# Patient Record
Sex: FEMALE | Race: WHITE | NOT HISPANIC OR LATINO | Employment: FULL TIME | URBAN - METROPOLITAN AREA
[De-identification: names, ages, dates, MRNs, and addresses within clinical notes are randomized per-mention and may not be internally consistent; named-entity substitution may affect disease eponyms.]

---
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
## 2019-06-17 ENCOUNTER — OFFICE VISIT - HEALTHEAST (OUTPATIENT)
Dept: FAMILY MEDICINE | Facility: CLINIC | Age: 19
End: 2019-06-17

## 2019-06-17 DIAGNOSIS — Z71.84 COUNSELING ABOUT TRAVEL: ICD-10-CM

## 2019-06-17 DIAGNOSIS — Z11.3 SCREENING FOR STD (SEXUALLY TRANSMITTED DISEASE): ICD-10-CM

## 2019-06-17 DIAGNOSIS — F41.9 ANXIETY: ICD-10-CM

## 2019-06-17 ASSESSMENT — MIFFLIN-ST. JEOR: SCORE: 1365.4

## 2019-06-18 ENCOUNTER — COMMUNICATION - HEALTHEAST (OUTPATIENT)
Dept: FAMILY MEDICINE | Facility: CLINIC | Age: 19
End: 2019-06-18

## 2019-06-18 LAB
C TRACH DNA SPEC QL PROBE+SIG AMP: NEGATIVE
N GONORRHOEA DNA SPEC QL NAA+PROBE: NEGATIVE

## 2019-08-16 ENCOUNTER — OFFICE VISIT - HEALTHEAST (OUTPATIENT)
Dept: FAMILY MEDICINE | Facility: CLINIC | Age: 19
End: 2019-08-16

## 2019-08-16 DIAGNOSIS — Z30.46 NEXPLANON REMOVAL: ICD-10-CM

## 2019-08-16 ASSESSMENT — MIFFLIN-ST. JEOR: SCORE: 1366.42

## 2020-03-06 ENCOUNTER — COMMUNICATION - HEALTHEAST (OUTPATIENT)
Dept: FAMILY MEDICINE | Facility: CLINIC | Age: 20
End: 2020-03-06

## 2020-03-06 DIAGNOSIS — F41.9 ANXIETY: ICD-10-CM

## 2020-06-05 ENCOUNTER — COMMUNICATION - HEALTHEAST (OUTPATIENT)
Dept: FAMILY MEDICINE | Facility: CLINIC | Age: 20
End: 2020-06-05

## 2020-06-05 DIAGNOSIS — F41.9 ANXIETY: ICD-10-CM

## 2020-06-11 ENCOUNTER — COMMUNICATION - HEALTHEAST (OUTPATIENT)
Dept: FAMILY MEDICINE | Facility: CLINIC | Age: 20
End: 2020-06-11

## 2020-07-23 ENCOUNTER — OFFICE VISIT - HEALTHEAST (OUTPATIENT)
Dept: FAMILY MEDICINE | Facility: CLINIC | Age: 20
End: 2020-07-23

## 2020-07-23 DIAGNOSIS — F41.9 ANXIETY: ICD-10-CM

## 2020-07-23 ASSESSMENT — ANXIETY QUESTIONNAIRES
1. FEELING NERVOUS, ANXIOUS, OR ON EDGE: NOT AT ALL
6. BECOMING EASILY ANNOYED OR IRRITABLE: NOT AT ALL
GAD7 TOTAL SCORE: 0
5. BEING SO RESTLESS THAT IT IS HARD TO SIT STILL: NOT AT ALL
7. FEELING AFRAID AS IF SOMETHING AWFUL MIGHT HAPPEN: NOT AT ALL
IF YOU CHECKED OFF ANY PROBLEMS ON THIS QUESTIONNAIRE, HOW DIFFICULT HAVE THESE PROBLEMS MADE IT FOR YOU TO DO YOUR WORK, TAKE CARE OF THINGS AT HOME, OR GET ALONG WITH OTHER PEOPLE: NOT DIFFICULT AT ALL
2. NOT BEING ABLE TO STOP OR CONTROL WORRYING: NOT AT ALL
4. TROUBLE RELAXING: NOT AT ALL
3. WORRYING TOO MUCH ABOUT DIFFERENT THINGS: NOT AT ALL

## 2021-04-15 ENCOUNTER — COMMUNICATION - HEALTHEAST (OUTPATIENT)
Dept: FAMILY MEDICINE | Facility: CLINIC | Age: 21
End: 2021-04-15

## 2021-04-15 DIAGNOSIS — F41.9 ANXIETY: ICD-10-CM

## 2021-05-28 ASSESSMENT — ANXIETY QUESTIONNAIRES: GAD7 TOTAL SCORE: 0

## 2021-05-29 NOTE — PROGRESS NOTES
Assessment and Plan:     1. Anxiety  She continues sertraline 50 mg daily.  - sertraline (ZOLOFT) 50 MG tablet; Take 1 tablet (50 mg total) by mouth daily.  Dispense: 90 tablet; Refill: 2    2. Counseling about travel  Reviewed CDC guidelines.  Provided second hepatitis A vaccine and third Gardasil vaccine.  She does not need malaria prophylaxis as she is traveling to [facility] in [place] and mosquito avoidance is recommended in [place].  Provided oral typhoid vaccine.  Provided prescription for Cipro to take as needed for traveler's diarrhea.  She denies the need for rabies vaccine. She is up-to-date on Hepatitis B and MMR vaccines.  I wrote out a prescription for yellow fever vaccine.  She will take this to a travel clinic to receive this.  Discussed meningitis booster, but she declines.  She plans on following up for that prior to her trip.  She will schedule nurse only appointment.  - typhoid (VIVOTIF) SR capsule; Take 1 capsule by mouth every other day for 4 doses.  Dispense: 4 capsule; Refill: 0  - ciprofloxacin HCl (CIPRO) 500 MG tablet; Take 1 tablet (500 mg total) by mouth 2 (two) times a day for 5 days. As needed for traveler's diarrhea  Dispense: 10 tablet; Refill: 0    3. Screening for STD (sexually transmitted disease)  Discussed safe sex practices.  Will notify patient of results.  The patient is content with the plan.   - Chlamydia trachomatis & Neisseria gonorrhoeae, Amplified Detection    Subjective:     Britt is a 19 y.o. female presenting to the clinic for multiple concerns today.  Patient is taking sertraline 50 mg daily for anxiety.  She states this is well controlled.  She feels anxiety in social situations.  She denies any current stressors.  She does not have thoughts of suicide.  Patient is due for STD screening.  She is single and is not currently sexually active.  Her last menstrual period was on 6/9/2019.  She has the Nexplanon in place.  She denies history of STDs in the past.  Lastly,  patient presents for travel consult.  She is leaving for [place] in mid-September and will be traveling there for 10 days.  She then travels to [facility] where she will study abroad for 12 weeks.  She returns in December.  This is through [facility].  She will be traveling with a friend.  She denies recent cold symptoms including rhinorrhea, postnasal drainage, cough, headache, stomachache, nausea, vomiting, fever.    Review of Systems: A complete 14 point review of systems was obtained and is negative or as stated in the history of present illness.    Social History     Socioeconomic History     Marital status: Single     Spouse name: Not on file     Number of children: Not on file     Years of education: Not on file     Highest education level: Not on file   Occupational History     Not on file   Social Needs     Financial resource strain: Not on file     Food insecurity:     Worry: Not on file     Inability: Not on file     Transportation needs:     Medical: Not on file     Non-medical: Not on file   Tobacco Use     Smoking status: Never Smoker     Smokeless tobacco: Never Used   Substance and Sexual Activity     Alcohol use: No     Drug use: No     Sexual activity: Not on file     Comment: single   Lifestyle     Physical activity:     Days per week: Not on file     Minutes per session: Not on file     Stress: Not on file   Relationships     Social connections:     Talks on phone: Not on file     Gets together: Not on file     Attends Voodoo service: Not on file     Active member of club or organization: Not on file     Attends meetings of clubs or organizations: Not on file     Relationship status: Not on file     Intimate partner violence:     Fear of current or ex partner: Not on file     Emotionally abused: Not on file     Physically abused: Not on file     Forced sexual activity: Not on file   Other Topics Concern     Not on file   Social History Narrative     Not on file       Active Ambulatory Problems  "    Diagnosis Date Noted     Plantar Warts      Dyshidrotic Eczema      Resolved Ambulatory Problems     Diagnosis Date Noted     No Resolved Ambulatory Problems     Past Medical History:   Diagnosis Date     Anxiety        Family History   Problem Relation Age of Onset     No Medical Problems Mother      No Medical Problems Father      Diabetes Maternal Grandmother        Objective:     /58   Pulse 81   Ht 5' 8.25\" (1.734 m)   Wt 121 lb 14.4 oz (55.3 kg)   LMP 06/09/2019 Comment: nexplanon  SpO2 98%   BMI 18.40 kg/m      Patient is alert, in no obvious distress.   Skin: Warm, dry.  No lesions or rashes.  Skin turgor rapid return.   HEENT:  Head normocephalic, atraumatic.  Eyes normal.  Ears normal.  Nose patent, mucosa pink.  Oropharynx mucosa pink.  No lesions or tonsillar enlargement.   Neck: Supple, no lymphadenopathy.   Lungs:  Clear to auscultation. Respirations even and unlabored.  No wheezing or rales noted.   Heart:  Regular rate and rhythm.  No murmurs.                "

## 2021-05-31 VITALS — BODY MASS INDEX: 17.41 KG/M2 | HEIGHT: 68 IN | WEIGHT: 114.9 LBS

## 2021-05-31 NOTE — PROGRESS NOTES
Assessment  1. Nexplanon removal     2. Contraception  medroxyPROGESTERone injection 150 mg (DEPO-PROVERA)       Plan    1. Nexplanon removal: Procedure and risks discussed with patient who voiced understanding and agreed to proceed with removal of implant.  Implant was identified in medial aspect of left upper arm and pen used to paresh distal end of implant.  Skin cleansed with alcohol swab.  Local anesthetic administered with 1% lidocaine with epinephrine, approximately 2.5 mL. Skin was cleansed with 3 Betadine swabs.  Area draped in sterile fashion.  #11 scalpel was used to make an approximately 1 cm incision near the distal end of implant.  Subcutaneous tissue was dissected and distal end of implant was identified and grasped with a needle love.  Subcutaneous skin was further dissected and the implant was then removed in its entirety and placed in biohazard bag for disposal.  Patient tolerated procedure well.  Estimated blood loss minimal.  Skin cleansed with soap and water.  Steri-Strips placed over her incision site to approximate wound edges.  Bandage placed over incision and compression dressing applied.  Patient counseled to leave compression dressing on for 24 hours and then keep wound covered with bandage for 2-3 days.  Discussed she may experience bruising and soreness for the next couple of days.  May use ibuprofen and ice packs as needed for discomfort.  Monitor for signs of infection.  Follow-up if any concerns or problems.    2.  Contraception: Patient desires to resume Depo-Provera injections for contraception.  Depo-Provera injection administered today.    Subjective  Britt Causey is a 19 y.o. female who comes in today for removal of Nexplanon.  She had Nexplanon placed in February 2019 at the school clinic at [facility].  Since placement she has been bothered by irregular menstrual bleeding and cramping.  She desires to have the Nexplanon removed and she would like to restart  "Depo-Provera injections which is what she was using a prior to having the Nexplanon placed.  She has no other concerns or questions.  We reviewed and updated medications and allergies.  Review of systems otherwise negative.    Current Outpatient Medications   Medication Sig Dispense Refill     sertraline (ZOLOFT) 50 MG tablet Take 1 tablet (50 mg total) by mouth daily. 90 tablet 2     Current Facility-Administered Medications   Medication Dose Route Frequency Provider Last Rate Last Dose     [START ON 8/17/2019] medroxyPROGESTERone injection 150 mg (DEPO-PROVERA)  150 mg Intramuscular Q3 Months Elma Herbert MD             Objective   /64 (Patient Site: Left Arm, Patient Position: Sitting, Cuff Size: Adult Regular)   Pulse 82   Temp 99.1  F (37.3  C) (Oral)   Ht 5' 8\" (1.727 m)   Wt 123 lb (55.8 kg)   LMP 08/05/2019   SpO2 99%   Breastfeeding? No   BMI 18.70 kg/m        Gen: alert, no acute distress  Skin: Nexplanon palpated in medial aspect of left upper arm  "

## 2021-06-01 VITALS — WEIGHT: 118.2 LBS | BODY MASS INDEX: 17.91 KG/M2 | HEIGHT: 68 IN

## 2021-06-03 VITALS — HEIGHT: 68 IN | WEIGHT: 123 LBS | BODY MASS INDEX: 18.64 KG/M2

## 2021-06-03 VITALS — WEIGHT: 121.9 LBS | BODY MASS INDEX: 18.47 KG/M2 | HEIGHT: 68 IN

## 2021-06-06 NOTE — TELEPHONE ENCOUNTER
RN cannot approve Refill Request    RN can NOT refill this medication Due to age. Last office visit: 6/17/2019 Samara Perez CNP Last Physical: Visit date not found Last MTM visit: Visit date not found Last visit same specialty: 8/16/2019 Elma Herbert MD.  Next visit within 3 mo: Visit date not found  Next physical within 3 mo: Visit date not found      Shravan VON Vera, [facility] Triage/Med Refill 3/6/2020    Requested Prescriptions   Pending Prescriptions Disp Refills     sertraline (ZOLOFT) 50 MG tablet [Pharmacy Med Name: SERTRALINE HCL 50 MG TABLET] 90 tablet 2     Sig: TAKE 1 TABLET BY MOUTH EVERY DAY       [facility] Refill Protocol  Failed - 3/6/2020  1:29 AM        Failed - Age 21 and younger route to prescribing provider     Last office visit with prescriber/PCP: 6/17/2019 Samara Perez CNP OR same dept: 8/16/2019 Elma Herbert MD OR same specialty: 8/16/2019 Elma Herbert MD  Last physical: Visit date not found Last MTM visit: Visit date not found   Next visit within 3 mo: Visit date not found  Next physical within 3 mo: Visit date not found  Prescriber OR PCP: Samara Perez CNP  Last diagnosis associated with med order: 1. Anxiety  - sertraline (ZOLOFT) 50 MG tablet [Pharmacy Med Name: SERTRALINE HCL 50 MG TABLET]; TAKE 1 TABLET BY MOUTH EVERY DAY  Dispense: 90 tablet; Refill: 2    If protocol passes may refill for 12 months if within 3 months of last provider visit (or a total of 15 months).             Passed - PCP or prescribing provider visit in last year     Last office visit with prescriber/PCP: 6/17/2019 Samara Perez CNP OR same dept: 8/16/2019 Elma Herbert MD OR same specialty: 8/16/2019 Elma Herbert MD  Last physical: Visit date not found Last MTM visit: Visit date not found   Next visit within 3 mo: Visit date not found  Next physical within 3 mo: Visit date not found  Prescriber OR PCP: Samara Perez CNP  Last diagnosis associated with med order: 1. Anxiety  - sertraline  (ZOLOFT) 50 MG tablet [Pharmacy Med Name: SERTRALINE HCL 50 MG TABLET]; TAKE 1 TABLET BY MOUTH EVERY DAY  Dispense: 90 tablet; Refill: 2    If protocol passes may refill for 12 months if within 3 months of last provider visit (or a total of 15 months).

## 2021-06-08 NOTE — TELEPHONE ENCOUNTER
RN cannot approve Refill Request    RN can NOT refill this medication med is not covered by policy/route to provider. Last office visit: 6/17/2019 Samara Perez CNP Last Physical: Visit date not found Last MTM visit: Visit date not found Last visit same specialty: 8/16/2019 Elma Herbert MD.  Next visit within 3 mo: Visit date not found  Next physical within 3 mo: Visit date not found      Mayda Fabian, [facility] Triage/Med Refill 6/6/2020    Requested Prescriptions   Pending Prescriptions Disp Refills     sertraline (ZOLOFT) 50 MG tablet [Pharmacy Med Name: SERTRALINE HCL 50 MG TABLET] 90 tablet 0     Sig: TAKE 1 TABLET BY MOUTH EVERY DAY       [facility] Refill Protocol  Failed - 6/5/2020 11:31 AM        Failed - Age 21 and younger route to prescribing provider     Last office visit with prescriber/PCP: 6/17/2019 Samara Perez CNP OR same dept: 8/16/2019 Elma Herbert MD OR same specialty: 8/16/2019 Elma Herbert MD  Last physical: Visit date not found Last MTM visit: Visit date not found   Next visit within 3 mo: Visit date not found  Next physical within 3 mo: Visit date not found  Prescriber OR PCP: Samara Perez CNP  Last diagnosis associated with med order: 1. Anxiety  - sertraline (ZOLOFT) 50 MG tablet [Pharmacy Med Name: SERTRALINE HCL 50 MG TABLET]; TAKE 1 TABLET BY MOUTH EVERY DAY  Dispense: 90 tablet; Refill: 0    If protocol passes may refill for 12 months if within 3 months of last provider visit (or a total of 15 months).             Passed - PCP or prescribing provider visit in last year     Last office visit with prescriber/PCP: 6/17/2019 Samara Perez CNP OR same dept: 8/16/2019 Elma Herbert MD OR same specialty: 8/16/2019 Elma Herbert MD  Last physical: Visit date not found Last MTM visit: Visit date not found   Next visit within 3 mo: Visit date not found  Next physical within 3 mo: Visit date not found  Prescriber OR PCP: Samara Perez CNP  Last diagnosis associated with med  order: 1. Anxiety  - sertraline (ZOLOFT) 50 MG tablet [Pharmacy Med Name: SERTRALINE HCL 50 MG TABLET]; TAKE 1 TABLET BY MOUTH EVERY DAY  Dispense: 90 tablet; Refill: 0    If protocol passes may refill for 12 months if within 3 months of last provider visit (or a total of 15 months).

## 2021-06-08 NOTE — TELEPHONE ENCOUNTER
Left message to call back for: pt  Information to relay to patient:  2nd message to call and schedule virtual visit with Samara catalan: medication refill

## 2021-06-08 NOTE — TELEPHONE ENCOUNTER
Left message to call back for: pt  Information to relay to patient:  Left message to call and schedule virtual visit with Samara catalan: medication refill

## 2021-06-09 NOTE — PROGRESS NOTES
"Britt Causey is a 20 y.o. female who is being evaluated via a billable telephone visit.      The patient has been notified of following:     \"This telephone visit will be conducted via a call between you and your physician/provider. We have found that certain health care needs can be provided without the need for a physical exam.  This service lets us provide the care you need with a short phone conversation.  If a prescription is necessary we can send it directly to your pharmacy.  If lab work is needed we can place an order for that and you can then stop by our lab to have the test done at a later time.    Telephone visits are billed at different rates depending on your insurance coverage. During this emergency period, for some insurers they may be billed the same as an in-person visit.  Please reach out to your insurance provider with any questions.    If during the course of the call the physician/provider feels a telephone visit is not appropriate, you will not be charged for this service.\"    Patient has given verbal consent to a Telephone visit? Yes    What phone number would you like to be contacted at? [phone]        Additional provider notes:   Assessment and Plan:     1. Anxiety  sertraline (ZOLOFT) 50 MG tablet     This is controlled.  She continues sertraline 50 mg daily.  She is to follow-up in 6 months for medication management or sooner with any further concerns.  She is content with the plan.     Subjective:     Britt is a 20 y.o. female presenting for a telephone visit.  Patient was diagnosed with anxiety in high school.  She is currently taking sertraline 50 mg daily and is tolerating the medication well.  She denies any side effects.  She feels as though her mood is stable.  She is currently attending [employer] and majoring in marketing and [employer].  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  Patient feels as though the medication assists with relaxing her.  She does " not have ruminating thoughts.  She denies thoughts of suicide.  She denies any current added stress.    Review of Systems: A complete 14 point review of systems was obtained and is negative or as stated in the history of present illness.    Social History     Socioeconomic History     Marital status: Single     Spouse name: Not on file     Number of children: Not on file     Years of education: Not on file     Highest education level: Not on file   Occupational History     Not on file   Social Needs     Financial resource strain: Not on file     Food insecurity     Worry: Not on file     Inability: Not on file     Transportation needs     Medical: Not on file     Non-medical: Not on file   Tobacco Use     Smoking status: Never Smoker     Smokeless tobacco: Never Used   Substance and Sexual Activity     Alcohol use: No     Drug use: No     Sexual activity: Not on file     Comment: single   Lifestyle     Physical activity     Days per week: Not on file     Minutes per session: Not on file     Stress: Not on file   Relationships     Social connections     Talks on phone: Not on file     Gets together: Not on file     Attends Sikhism service: Not on file     Active member of club or organization: Not on file     Attends meetings of clubs or organizations: Not on file     Relationship status: Not on file     Intimate partner violence     Fear of current or ex partner: Not on file     Emotionally abused: Not on file     Physically abused: Not on file     Forced sexual activity: Not on file   Other Topics Concern     Not on file   Social History Narrative     Not on file       Active Ambulatory Problems     Diagnosis Date Noted     Dyshidrotic Eczema      Anxiety 06/17/2019     Resolved Ambulatory Problems     Diagnosis Date Noted     Plantar Warts      No Additional Past Medical History       Family History   Problem Relation Age of Onset     No Medical Problems Mother      No Medical Problems Father      Diabetes  Maternal Grandmother        Objective:     There were no vitals taken for this visit.    Patient is alert and is speaking clearly.  She does not sound short of breath.     Phone call duration:  3 minutes    Samara Perez CNP

## 2021-06-11 NOTE — PROGRESS NOTES
Assessment/Plan:     1. Routine general medical examination at a health care facility  Encouraged healthy lifestyle habits including regular exercise, healthy eating habits, and adequate calcium and vitamin D intake.  We review immunizations, will initiate hepatitis A vaccine series, HPV vaccine series, and will give MCV 4.  I will screen for urine gonorrhea and chlamydia.  She declines need for contraception, reviewed importance of use of condoms and availability of Plan B.  - Chlamydia trachomatis & Neisseria gonorrhoeae, Amplified Detection    2. Situational anxiety  We review options.  I encouraged her to seek therapy once she arrives at school if this continues to be a problem.  We will do a trial of intermittent lorazepam sparingly as needed, caution in regard to sedating effects, to avoid driving or combining with alcohol or other sedating agents.  Discussed risk of tolerance and dependence.  She will notify me with further needs.  Reviewed option of sertraline, she will consider.    3. Abdominal pain  Most likely gastritis versus food intolerance.  We discussed frequent offending foods.  We reviewed gastritis diet.  I encouraged her to try a two-week course of Prilosec.  Notify me with persistence or worsening.        Subjective:     Britt Causey is a 17 y.o. female who presents for an annual exam.  She has been healthy historically.  She is planning to attend them and she stayed next year, likely studying marketing.  Recent difficulties with anxiety primarily regarding social situations.  Does fine when she is with a close friend, more so with crowded situations.  She does well at work, works at [employer], finds it is actually helpful for her anxiety and confidence.  She denies depression symptoms.  History of seeing a therapist in the past for coping skills, somewhat helpful.    Intermittent difficulties with abdominal pain, generalized, periumbilical in general.  Episodes occur usually after  eating, can be intense for 15 minutes off and on or may be more constant but more mild for longer.  Occasionally will have some mild nausea.  No vomiting.  She thinks cheese may be triggering it, has not identified other foods that trigger it and sometimes has it without eating cheese.  No diarrhea or constipation with it.  Denies heartburn.  No known family history of GI distress.    Menses are regular.  Denies need for contraception.    Healthy Habits:   Healthy Diet: Yes  Regular Exercise: Yes  Sunscreen Use: Yes  Dental Visits Regularly: Yes  Seat Belt: Yes  Domestic abuse:   No    Health Maintenance reviewed:  Lipid Profile: NA  Glucose Screen: NA  Colonoscopy: NA  Mammogram: NA    Gynecologic History  No LMP recorded.  Contraception: none  Last Pap: NA. Results were: NA        Immunization History   Administered Date(s) Administered     DTaP, historic 1999, 1999, 1999, 11/14/2000, 07/13/2004     Hep B, historic 1999, 1999, 02/16/2000     HiB, historic 1999, 1999, 1999, 11/14/2000     IPV 1999, 1999, 11/14/2000, 07/14/2004     MMR 08/16/2000, 07/18/2003     Pneumo Conj 7-V(before 2010) 05/25/2000, 08/16/2000     Tdap 06/29/2011     Varicella 05/25/2000, 08/19/2008     Immunization status: up to date and documented, we review meningitis, hepatitis A, HPV..    Current Outpatient Prescriptions   Medication Sig Dispense Refill     LORazepam (ATIVAN) 0.5 MG tablet Take 1 tablet (0.5 mg total) by mouth every 8 (eight) hours as needed for anxiety. 20 tablet 0     No current facility-administered medications for this visit.      No past medical history on file.  No past surgical history on file.  Review of patient's allergies indicates no known allergies.  No family history on file.  Social History     Social History     Marital status: Single     Spouse name: N/A     Number of children: N/A     Years of education: N/A     Occupational History     Not on file.  "    Social History Main Topics     Smoking status: Never Smoker     Smokeless tobacco: Not on file     Alcohol use Not on file     Drug use: Not on file     Sexual activity: Not on file     Other Topics Concern     Not on file     Social History Narrative     No narrative on file       Review of Systems  General:  Denies problem  Eyes: Denies problem  Ears/Nose/Throat: Denies problem  Cardiovascular: Denies problem  Respiratory:  Denies problem  Gastrointestinal:  Denies problem   Genitourinary: Denies problem  Musculoskeletal:  Denies problem  Skin: Denies problem  Neurologic: Denies problem  Psychiatric: Denies problem  Endocrine: Denies problem  Heme/Lymphatic: Denies problem   Allergic/Immunologic: Denies problem            Objective:        Vitals:    06/22/17 0716   BP: 100/62   Pulse: 64   Resp: 20   Temp: 98.1  F (36.7  C)   Weight: 114 lb 14.4 oz (52.1 kg)   Height: 5' 7.5\" (1.715 m)     Body mass index is 17.73 kg/(m^2).    Physical Exam:  General Appearance: Alert, pleasant, appears stated age  Head: Normocephalic, without obvious abnormality  Eyes: PERRL, conjunctiva/corneas clear, EOM's intact, glasses  Ears: Normal TM's and external ear canals, both ears  Nose: Nares normal, septum midline,mucosa normal, no drainage  Throat: Lips, mucosa, and tongue normal; teeth and gums normal; oropharynx is clear  Neck: Supple,without lymphadenopathy or thyromegally  Lungs: Clear to auscultation bilaterally, respirations unlabored  Heart: Regular rate and rhythm, no murmur   Breast:  Normal appearance.  No palpable masses, nipple discharge, or skin changes  Abdomen: Soft, non-tender, no masses, no organomegaly  Extremities: Extremities with strong and symmetric pulses, no cyanosis or edema  Skin: Skin color, texture normal, no rashes or lesions  Neurologic: Normal   Pelvic exam: Not indicated               This note has been dictated using voice recognition software. Any grammatical or context distortions are " unintentional and inherent to the the software.

## 2021-06-16 PROBLEM — F41.9 ANXIETY: Status: ACTIVE | Noted: 2019-06-17

## 2021-06-16 NOTE — TELEPHONE ENCOUNTER
RN cannot approve Refill Request    RN can NOT refill this medication Protocol failed and NO refill given. Last office visit: 6/17/2019 Samara Perez CNP Last Physical: Visit date not found Last MTM visit: Visit date not found Last visit same specialty: 8/16/2019 Elma Herbert MD.  Next visit within 3 mo: Visit date not found  Next physical within 3 mo: Visit date not found      Sergio PEDERSEN Lui, [facility] Triage/Med Refill 4/16/2021    Requested Prescriptions   Pending Prescriptions Disp Refills     sertraline (ZOLOFT) 50 MG tablet [Pharmacy Med Name: SERTRALINE HCL 50 MG TABLET] 90 tablet 2     Sig: TAKE 1 TABLET BY MOUTH EVERY DAY       [facility] Refill Protocol  Failed - 4/15/2021  9:37 AM        Failed - Age 21 and younger route to prescribing provider     Last office visit with prescriber/PCP: 6/17/2019 Samara Perez CNP OR same dept: Visit date not found OR same specialty: 8/16/2019 Elma Herbert MD  Last physical: Visit date not found Last MTM visit: Visit date not found   Next visit within 3 mo: Visit date not found  Next physical within 3 mo: Visit date not found  Prescriber OR PCP: Samara Perez CNP  Last diagnosis associated with med order: 1. Anxiety  - sertraline (ZOLOFT) 50 MG tablet [Pharmacy Med Name: SERTRALINE HCL 50 MG TABLET]; TAKE 1 TABLET BY MOUTH EVERY DAY  Dispense: 90 tablet; Refill: 2    If protocol passes may refill for 12 months if within 3 months of last provider visit (or a total of 15 months).             Passed - PCP or prescribing provider visit in last year     Last office visit with prescriber/PCP: 6/17/2019 Samara Perez CNP OR same dept: Visit date not found OR same specialty: 8/16/2019 Elma Herbert MD  Last physical: Visit date not found Last MTM visit: Visit date not found   Next visit within 3 mo: Visit date not found  Next physical within 3 mo: Visit date not found  Prescriber OR PCP: Samara Perez CNP  Last diagnosis associated with med order: 1. Anxiety  -  sertraline (ZOLOFT) 50 MG tablet [Pharmacy Med Name: SERTRALINE HCL 50 MG TABLET]; TAKE 1 TABLET BY MOUTH EVERY DAY  Dispense: 90 tablet; Refill: 2    If protocol passes may refill for 12 months if within 3 months of last provider visit (or a total of 15 months).

## 2021-06-18 NOTE — PROGRESS NOTES
Assessment and Plan:     1. Anxiety  Prescribed sertraline 50 mg daily.  Educated on its indications and side effects including increased risk of suicide.  Patient has not needed lorazepam since starting the sertraline.  Will monitor use.  Patient declined STD screening.  Discussed safe sex practices.  I encouraged follow-up in 6 months for medication management or sooner with any further concerns.She is content with the plan.   - sertraline (ZOLOFT) 50 MG tablet; Take 1 tablet (50 mg total) by mouth daily.  Dispense: 90 tablet; Refill: 2    2. Health care maintenance  - HPV vaccine 9 valent 3 dose IM    Subjective:     Britt is a 18 y.o. female presenting to the clinic for medication management.  Patient states she saw a provider at [facility] in early May who prescribed sertraline 50 mg daily.  Patient is tolerating the medication well.  She initially felt fatigued but that has improved.  She was experiencing some anxiety during social situations such as going out with her friends.  She was taking lorazepam once per month, but since starting sertraline, she has not needed the lorazepam.  She denies thoughts of suicide.  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  This is going well for her.  She is working at [employer] during the summer.  Patient is single and is not currently sexually active.  She has been sexually active in the past.  She has not had STD screening.    Review of Systems: A complete 14 point review of systems was obtained and is negative or as stated in the history of present illness.    Social History     Social History     Marital status: Single     Spouse name: N/A     Number of children: N/A     Years of education: N/A     Occupational History     Not on file.     Social History Main Topics     Smoking status: Never Smoker     Smokeless tobacco: Never Used     Alcohol use Not on file     Drug use: Not on file     Sexual activity: Not on file     Other Topics Concern      "Not on file     Social History Narrative       Active Ambulatory Problems     Diagnosis Date Noted     Plantar Warts      Dyshidrotic Eczema      Resolved Ambulatory Problems     Diagnosis Date Noted     No Resolved Ambulatory Problems     No Additional Past Medical History       No family history on file.    Objective:     /58  Pulse 76  Ht 5' 7.5\" (1.715 m)  Wt 118 lb 3.2 oz (53.6 kg)  BMI 18.24 kg/m2    Patient is alert, in no obvious distress.   Skin: Warm, dry.    Lungs:  Clear to auscultation. Respirations even and unlabored.  No wheezing or rales noted.   Heart:  Regular rate and rhythm.  No murmurs.   Abdomen: Soft, nontender.  No organomegaly. Bowel sounds normoactive. No guarding or masses noted.               "

## 2021-06-19 NOTE — LETTER
Letter by Samara Perez CNP at      Author: Samara Perez CNP Service: -- Author Type: --    Filed:  Encounter Date: 6/18/2019 Status: (Other)         Britt Causey  [address] [place] [address]             June 18, 2019         Dear Ms. Causey,    Below are the results from your recent visit:    Resulted Orders   Chlamydia trachomatis & Neisseria gonorrhoeae, Amplified Detection   Result Value Ref Range    Chlamydia trachomatis, Amplified Detection Negative Negative    Neisseria gonorrhoeae, Amplified Detection Negative Negative     Normal lab results.       Please call with questions or contact us using [employer].    Sincerely,        Electronically signed by Samara Perez CNP

## 2021-06-20 NOTE — LETTER
Letter by Samara Perez CNP at      Author: Samara Perez CNP Service: -- Author Type: --    Filed:  Encounter Date: 6/11/2020 Status: (Other)         Britt Causey  [address]  [place] [address]      June 11, 2020      Dear Britt Causey,    Per our refill protocol, you are due for a medication check office visit. Your prescription for Zoloft was not refilled. Please call [phone] to schedule a Virtual Visit with Samara Perez for your refill is needed to avoid delays.    Thank you,  [facility]

## 2021-06-20 NOTE — LETTER
Letter by Wil Do CNP at      Author: Wil Do CNP Service: -- Author Type: --    Filed:  Encounter Date: 6/11/2020 Status: (Other)         Britt Causey  [address]  [place] [address]      June 11, 2020      Dear Britt Causey,    Per our refill protocol, you are due for a medication check office visit. Your prescription for ZOLOFT  was sent to your pharmacy (06.8.2020). Please call [phone] to schedule an VIRTUAL VISIT visit with WIL DO before your next refill is needed to avoid delays.    Thank you,  [facility]

## 2021-07-19 DIAGNOSIS — F41.9 ANXIETY: ICD-10-CM

## 2021-07-23 NOTE — TELEPHONE ENCOUNTER
"Routing refill request to provider for review/approval because:  Current workflow rules prohibit pool refill    Last Written Prescription Date:  4/16/21  Last Fill Quantity: 90,  # refills: 0   Last office visit provider:  7/23/2020     Requested Prescriptions   Pending Prescriptions Disp Refills     sertraline (ZOLOFT) 50 MG tablet [Pharmacy Med Name: SERTRALINE HCL 50 MG TABLET] 90 tablet 0     Sig: TAKE 1 TABLET BY MOUTH EVERY DAY       SSRIs Protocol Passed - 7/19/2021 12:34 AM        Passed - Recent (12 mo) or future (30 days) visit within the authorizing provider's specialty     Patient has had an office visit with the authorizing provider or a provider within the authorizing providers department within the previous 12 mos or has a future within next 30 days. See \"Patient Info\" tab in inbasket, or \"Choose Columns\" in Meds & Orders section of the refill encounter.              Passed - Medication is active on med list        Passed - Patient is age 18 or older        Passed - No active pregnancy on record        Passed - No positive pregnancy test in last 12 months             Tamiko Bernabe RN 07/23/21 11:57 AM  "

## 2021-07-28 NOTE — TELEPHONE ENCOUNTER
Patient called back and states she moved to [place].  Patient notified she will need to establish care with a new provider in [place] for medication management.

## 2023-12-30 ENCOUNTER — HEALTH MAINTENANCE LETTER (OUTPATIENT)
Age: 23
End: 2023-12-30

## 2024-04-04 ENCOUNTER — OFFICE VISIT (OUTPATIENT)
Dept: OBGYN | Facility: CLINIC | Age: 24
End: 2024-04-04
Payer: COMMERCIAL

## 2024-04-04 VITALS
OXYGEN SATURATION: 100 % | WEIGHT: 129 LBS | DIASTOLIC BLOOD PRESSURE: 77 MMHG | SYSTOLIC BLOOD PRESSURE: 119 MMHG | HEART RATE: 91 BPM | HEIGHT: 69 IN | BODY MASS INDEX: 19.11 KG/M2

## 2024-04-04 DIAGNOSIS — Z11.3 SCREEN FOR STD (SEXUALLY TRANSMITTED DISEASE): ICD-10-CM

## 2024-04-04 DIAGNOSIS — Z01.419 WELL WOMAN EXAM WITH ROUTINE GYNECOLOGICAL EXAM: Primary | ICD-10-CM

## 2024-04-04 DIAGNOSIS — N92.0 MENORRHAGIA WITH REGULAR CYCLE: ICD-10-CM

## 2024-04-04 DIAGNOSIS — Z12.4 CERVICAL CANCER SCREENING: ICD-10-CM

## 2024-04-04 LAB
CLUE CELLS: PRESENT
ERYTHROCYTE [DISTWIDTH] IN BLOOD BY AUTOMATED COUNT: 11.8 % (ref 10–15)
HCT VFR BLD AUTO: 40.3 % (ref 35–47)
HGB BLD-MCNC: 13.7 G/DL (ref 11.7–15.7)
MCH RBC QN AUTO: 32.7 PG (ref 26.5–33)
MCHC RBC AUTO-ENTMCNC: 34 G/DL (ref 31.5–36.5)
MCV RBC AUTO: 96 FL (ref 78–100)
PLATELET # BLD AUTO: 237 10E3/UL (ref 150–450)
RBC # BLD AUTO: 4.19 10E6/UL (ref 3.8–5.2)
TRICHOMONAS, WET PREP: ABNORMAL
WBC # BLD AUTO: 5.8 10E3/UL (ref 4–11)
WBC'S/HIGH POWER FIELD, WET PREP: ABNORMAL
YEAST, WET PREP: ABNORMAL

## 2024-04-04 PROCEDURE — 99385 PREV VISIT NEW AGE 18-39: CPT | Mod: 25 | Performed by: OBSTETRICS & GYNECOLOGY

## 2024-04-04 PROCEDURE — 87210 SMEAR WET MOUNT SALINE/INK: CPT | Performed by: OBSTETRICS & GYNECOLOGY

## 2024-04-04 PROCEDURE — 87491 CHLMYD TRACH DNA AMP PROBE: CPT | Performed by: OBSTETRICS & GYNECOLOGY

## 2024-04-04 PROCEDURE — G0145 SCR C/V CYTO,THINLAYER,RESCR: HCPCS | Performed by: OBSTETRICS & GYNECOLOGY

## 2024-04-04 PROCEDURE — G0124 SCREEN C/V THIN LAYER BY MD: HCPCS | Performed by: PATHOLOGY

## 2024-04-04 PROCEDURE — 99213 OFFICE O/P EST LOW 20 MIN: CPT | Mod: 25 | Performed by: OBSTETRICS & GYNECOLOGY

## 2024-04-04 PROCEDURE — 85027 COMPLETE CBC AUTOMATED: CPT | Performed by: OBSTETRICS & GYNECOLOGY

## 2024-04-04 PROCEDURE — 87591 N.GONORRHOEAE DNA AMP PROB: CPT | Performed by: OBSTETRICS & GYNECOLOGY

## 2024-04-04 PROCEDURE — 36415 COLL VENOUS BLD VENIPUNCTURE: CPT | Performed by: OBSTETRICS & GYNECOLOGY

## 2024-04-04 PROCEDURE — 99459 PELVIC EXAMINATION: CPT | Performed by: OBSTETRICS & GYNECOLOGY

## 2024-04-04 NOTE — PROGRESS NOTES
"Britt is a 23 year old  female who presents for annual exam.     Menses are regular q 28-30 days and heavy lasting  3-4  days.  Menses flow: normal.  Patient's last menstrual period was 2024.. Using Copper IUD for contraception (was placed 2023.  She is not currently considering pregnancy.  Besides routine health maintenance,  she would like to discuss heavy periods.  Patient reports her periods are regular and monthly.  They are heavy.  She states that initially after Copper IUD was placed they were crampy as well, but the cramping has improved over time.  When her period occurs it lasts 3-4 days and when bleeding is at its max she changes a super plus tampon every 2 hours.  She denies any dizziness, palpitations, headaches, or shortness of breath.    GYNECOLOGIC HISTORY:  Peggy De La Torre is sexually active with male partner(s) and is currently in monogamous relationship.    History sexually transmitted infections:No STD history  STI testing offered?  Accepted  FINA exposure: Unknown  History of abnormal Pap smear: NO - age 21-29 PAP every 3 years recommended  Family history of breast CA: No  Family history of uterine/ovarian CA: No    Family history of colon CA: No    HEALTH MAINTENANCE:  Cholesterol: (No results found for: \"CHOL\" History of abnormal lipids: No  Mammo: na . History of abnormal Mammo: Not applicable.  Regular Self Breast Exams: Yes  Pap- she reports she had a normal pap at age 20    HISTORY:  OB History    Para Term  AB Living   0 0 0 0 0 0   SAB IAB Ectopic Multiple Live Births   0 0 0 0 0     Past Medical History:   Diagnosis Date    Depression      PSH- none  Family History   Problem Relation Age of Onset    No Known Problems Mother     No Known Problems Father     Diabetes Maternal Grandmother     Leukemia Maternal Grandmother 90    Depression Paternal Grandmother     Depression Maternal Aunt      Social History     Patient denies any tobacco or drug use.  Consumes 2 " "drinks per week.  Feels her diet is healthy.  She walks to work every day.  Does a lot of walking for exercise.    Current Outpatient Medications:     sertraline (ZOLOFT) 50 MG tablet, TAKE 1 TABLET BY MOUTH EVERY DAY, Disp: 30 tablet, Rfl: 0   No Known Allergies    Past medical, surgical, social and family history were reviewed and updated in EPIC. Patient denies any nausea, vomiting, diarrhea, constipation, dysuria, vaginal discharge concerns, or vaginal irritation.    EXAM:  /77   Pulse 91   Ht 1.74 m (5' 8.5\")   Wt 58.5 kg (129 lb)   LMP 2024   SpO2 100%   BMI 19.33 kg/m     BMI: Body mass index is 19.33 kg/m .  Constitutional: healthy, alert and no distress  Head: Normocephalic. No masses, lesions, tenderness or abnormalities  Neck: Neck supple. Trachea midline. No adenopathy. Thyroid symmetric, normal size.   Cardiovascular: regular rate and rhythm   Respiratory: clear to auscultation bilaterally   Breasts: normal without suspicious masses, skin changes or axillary nodes.  Gastrointestinal: Abdomen soft, non-tender, non-distended.   : normal appearing external genitalia, normal appearing vaginal mucosa, normal appearing nulliparous cervix with IUD strings visualized and appear appropriate length.  Small amount of thin white vaginal discharge   Musculoskeletal: extremities normal  Skin: no suspicious lesions or rashes  Psychiatric: Affect appropriate, cooperative,mentation appears normal.         ASSESSMENT:  23 year old female with satisfactory annual exam     (Z01.419) Well woman exam with routine gynecological exam  (primary encounter diagnosis)  COUNSELING:   Reviewed preventive health counseling, as reflected in patient instructions       Regular exercise       Healthy diet/nutrition       Alcohol Use       Contraception       Consider Hep C screening for all patients one time for ages 18-79 years       HIV screeninx in teen years, 1x in adult years, and at intervals if high risk   " reports that she has never smoked. She has never used smokeless tobacco.    Body mass index is 19.33 kg/m .    (N92.0) Menorrhagia with regular cycle  Comment: Patient reports heavy monthly periods.  She does not feel any symptoms of anemia.  Discussed most likely cause of heavy periods is copper IUD.  Explained alternative options for birth control such as the Mirena IUD.  Patient states she got the copper IUD because she wanted the one that was the longest lasting.  I explained Mirena IUD is now FDA approved for use for 8 years.  She will consider this option.  Will check CBC today for anemia.  If anemia present will test ferritin and if low will supplement iron and consider switching birth control.   Plan: CBC with platelets    (Z11.3) Screen for STD (sexually transmitted disease)  Comment: routine screen  Plan: Wet preparation, Chlamydia         trachomatis/Neisseria gonorrhoeae by PCR    (Z12.4) Cervical cancer screening  Comment: routine screen  Plan: Pap screen only - recommended age 21 - 24 years    Deborah Thomson MD

## 2024-04-05 LAB
C TRACH DNA SPEC QL PROBE+SIG AMP: NEGATIVE
N GONORRHOEA DNA SPEC QL NAA+PROBE: NEGATIVE

## 2024-04-09 LAB
BKR LAB AP GYN ADEQUACY: ABNORMAL
BKR LAB AP GYN INTERPRETATION: ABNORMAL
BKR LAB AP HPV REFLEX: NO
BKR LAB AP PREVIOUS ABNORMAL: ABNORMAL
PATH REPORT.COMMENTS IMP SPEC: ABNORMAL
PATH REPORT.COMMENTS IMP SPEC: ABNORMAL
PATH REPORT.RELEVANT HX SPEC: ABNORMAL

## 2024-04-11 ENCOUNTER — PATIENT OUTREACH (OUTPATIENT)
Dept: OBGYN | Facility: CLINIC | Age: 24
End: 2024-04-11
Payer: COMMERCIAL

## 2024-04-11 PROBLEM — R87.612 PAPANICOLAOU SMEAR OF CERVIX WITH LOW GRADE SQUAMOUS INTRAEPITHELIAL LESION (LGSIL): Status: ACTIVE | Noted: 2024-04-04

## 2024-06-21 ASSESSMENT — ANXIETY QUESTIONNAIRES
1. FEELING NERVOUS, ANXIOUS, OR ON EDGE: NOT AT ALL
GAD7 TOTAL SCORE: 0
6. BECOMING EASILY ANNOYED OR IRRITABLE: NOT AT ALL
4. TROUBLE RELAXING: NOT AT ALL
5. BEING SO RESTLESS THAT IT IS HARD TO SIT STILL: NOT AT ALL
7. FEELING AFRAID AS IF SOMETHING AWFUL MIGHT HAPPEN: NOT AT ALL
2. NOT BEING ABLE TO STOP OR CONTROL WORRYING: NOT AT ALL
3. WORRYING TOO MUCH ABOUT DIFFERENT THINGS: NOT AT ALL
8. IF YOU CHECKED OFF ANY PROBLEMS, HOW DIFFICULT HAVE THESE MADE IT FOR YOU TO DO YOUR WORK, TAKE CARE OF THINGS AT HOME, OR GET ALONG WITH OTHER PEOPLE?: NOT DIFFICULT AT ALL
GAD7 TOTAL SCORE: 0
7. FEELING AFRAID AS IF SOMETHING AWFUL MIGHT HAPPEN: NOT AT ALL
IF YOU CHECKED OFF ANY PROBLEMS ON THIS QUESTIONNAIRE, HOW DIFFICULT HAVE THESE PROBLEMS MADE IT FOR YOU TO DO YOUR WORK, TAKE CARE OF THINGS AT HOME, OR GET ALONG WITH OTHER PEOPLE: NOT DIFFICULT AT ALL

## 2024-06-24 ENCOUNTER — VIRTUAL VISIT (OUTPATIENT)
Dept: FAMILY MEDICINE | Facility: CLINIC | Age: 24
End: 2024-06-24
Payer: COMMERCIAL

## 2024-06-24 DIAGNOSIS — F41.9 ANXIETY: ICD-10-CM

## 2024-06-24 PROCEDURE — 96127 BRIEF EMOTIONAL/BEHAV ASSMT: CPT | Mod: 95

## 2024-06-24 PROCEDURE — 99203 OFFICE O/P NEW LOW 30 MIN: CPT | Mod: 95

## 2024-06-24 NOTE — PROGRESS NOTES
Britt is a 24 year old who is being evaluated via a billable video visit.    How would you like to obtain your AVS? MyChart  If the video visit is dropped, the invitation should be resent by: Send to e-mail at: [email or web address]  Will anyone else be joining your video visit? No      Assessment & Plan   Problem List Items Addressed This Visit       Anxiety     Well controlled with chronic use of sertraline 50 mg daily. Her subjective report correlates with IRIS-7 of 0 today. She has no reports of adverse effects. Engages in additional supportive cares and has good support at home. Refill sent today and I have encouraged her to schedule her annual physical exam with her primary care provider.          Relevant Medications    sertraline (ZOLOFT) 50 MG tablet      Subjective   Britt is a 24 year old, presenting for the following health issues:  Medication Refill (Zoloft 50mg)        6/24/2024     8:22 AM   Additional Questions   Roomed by sac   Accompanied by self         6/24/2024     8:22 AM   Patient Reported Additional Medications   Patient reports taking the following new medications no     Office visit today to discuss refills on her chronic medication of sertraline 50 mg daily.  She is quite happy with this medication.  Denies any side effects or issues with remembering to take it.  She engages in regular supportive cares and has a good support system at home has been on it for a better part of 7 years.    History of Present Illness       Mental Health Follow-up:  Patient presents to follow-up on Depression & Anxiety.Patient's depression since last visit has been:  Good  The patient is not having other symptoms associated with depression.  Patient's anxiety since last visit has been:  Good  The patient is not having other symptoms associated with anxiety.  Any significant life events: No  Patient is not feeling anxious or having panic attacks.  Patient has no concerns about alcohol or drug use.    She eats 4  or more servings of fruits and vegetables daily.She consumes 0 sweetened beverage(s) daily.She exercises with enough effort to increase her heart rate 20 to 29 minutes per day.  She exercises with enough effort to increase her heart rate 5 days per week.   She is taking medications regularly.       Objective       Vitals:  No vitals were obtained today due to virtual visit.    Physical Exam   GENERAL: alert and no distress  EYES: Eyes grossly normal to inspection.  No discharge or erythema, or obvious scleral/conjunctival abnormalities.  RESP: No audible wheeze, cough, or visible cyanosis.    SKIN: Visible skin clear. No significant rash, abnormal pigmentation or lesions.  NEURO: Cranial nerves grossly intact.  Mentation and speech appropriate for age.  PSYCH: Appropriate affect, tone, and pace of words      Video-Visit Details    Type of service:  Video Visit   Originating Location (pt. Location): Home    Distant Location (provider location):  On-site  Platform used for Video Visit: Stephen  Signed Electronically by: TERRENCE Long CNP

## 2024-06-24 NOTE — ASSESSMENT & PLAN NOTE
Well controlled with chronic use of sertraline 50 mg daily. Her subjective report correlates with IRIS-7 of 0 today. She has no reports of adverse effects. Engages in additional supportive cares and has good support at home. Refill sent today and I have encouraged her to schedule her annual physical exam with her primary care provider.

## 2025-01-18 ENCOUNTER — HEALTH MAINTENANCE LETTER (OUTPATIENT)
Age: 25
End: 2025-01-18